# Patient Record
Sex: FEMALE | Race: WHITE | NOT HISPANIC OR LATINO | URBAN - METROPOLITAN AREA
[De-identification: names, ages, dates, MRNs, and addresses within clinical notes are randomized per-mention and may not be internally consistent; named-entity substitution may affect disease eponyms.]

---
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
## 2023-04-13 ENCOUNTER — VIRTUAL VISIT (OUTPATIENT)
Dept: FAMILY MEDICINE | Facility: CLINIC | Age: 18
End: 2023-04-13
Payer: COMMERCIAL

## 2023-04-13 DIAGNOSIS — Z09 HOSPITAL DISCHARGE FOLLOW-UP: Primary | ICD-10-CM

## 2023-04-13 DIAGNOSIS — R10.13 ABDOMINAL PAIN, EPIGASTRIC: ICD-10-CM

## 2023-04-13 DIAGNOSIS — R10.33 UMBILICAL PAIN: ICD-10-CM

## 2023-04-13 PROCEDURE — 99214 OFFICE O/P EST MOD 30 MIN: CPT | Mod: VID | Performed by: NURSE PRACTITIONER

## 2023-04-13 NOTE — PROGRESS NOTES
"Mary is a 18 year old who is being evaluated via a billable video visit.      How would you like to obtain your AVS? MyChart  If the video visit is dropped, the invitation should be resent by: Text to cell phone: [phone]  Will anyone else be joining your video visit? No        Assessment & Plan     Hospital discharge follow-up      Abdominal pain, epigastric      Umbilical pain    30 minutes spent by me on the date of the encounter doing chart review, history and exam, documentation and further activities per the note     MED REC REQUIRED  Post Medication Reconciliation Status: discharge medications reconciled, continue medications without change  BMI:   Estimated body mass index is 49.6 kg/m  as calculated from the following:    Height as of 5/17/22: 1.583 m (5' 2.32\").    Weight as of 5/17/22: 124.3 kg (274 lb).   Weight management plan: Discussed healthy diet and exercise guidelines Discussed regular exercise, diet changes, weight loss will help to prevent chronic disease such as diabetes, high blood pressure, high cholesterol and will improve mood    See Patient Instructions: Review after visit summary tips.  Cut out foods that could be causing symptoms.  If symptoms worsen go in for further evaluation.  If symptoms persist let me know if you want abdominal ultrasound to look for hernia versus EGD scope to look for H. pylori or ulcers.  Patient in agreement.    SAL WALLACE  [facility] RAINER Hernandez is a 18 year old, presenting for the following health issues:  Hospital F/U    Chart reviewed and discussed with patient she went to the ER for abdominal pain she reports the pain started the Sunday prior to Easter.  She did have abdominal CT that was normal.  She reports the pain is around her bellybutton and comes and goes.  She has not found anything that makes it better or worse.  She does have nausea when she is having the pain.  She does not have a family history of " Crohn's, colitis, diverticulitis or colon cancer; she denies a change of stools or urinary symptoms.  She denies chance of pregnancy.  She denies fevers.  The pain is more often when laying down for sleep.  She describes the pain as when her stomach feels empty or hollow.  She denies heartburn or acid reflux.  She was given famotidine at the ER and feels her symptoms are improving but not resolved.  Discussed she can continue the acid reducer.  We can give her information about foods to avoid that could be triggering her symptoms she can try food elimination diet.  If symptoms persist we can either do an ultrasound to look and see if she has a hernia or issues going on with her appendix such as a smoldering appendix or we can do an EGD scope that would test for H. pylori and or look for ulcers.  Patient will review after visit summary tips and try food limit nation diet as well as continued acid reducer.  If symptoms continue she will contact provider for further treatment or imaging.  Discussed if symptoms are severe return to the ER.  Patient in agreement.      4/13/2023     8:13 AM   Additional Questions   Roomed by Radha CARDENAS     ED/UC Followup:    Facility: [facility]   Date of visit: 4/8/23  Reason for visit: abdominal pain  Current Status:      Review of Systems   Constitutional, HEENT, cardiovascular, pulmonary, GI, , musculoskeletal, neuro, skin, endocrine and psych systems are negative, except as otherwise noted.      Objective           Vitals:  No vitals were obtained today due to virtual visit.    Physical Exam   GENERAL: Healthy, alert and no distress  EYES: Eyes grossly normal to inspection.  No discharge or erythema, or obvious scleral/conjunctival abnormalities.  RESP: No audible wheeze, cough, or visible cyanosis.  No visible retractions or increased work of breathing.    SKIN: Visible skin clear. No significant rash, abnormal pigmentation or lesions.  NEURO: Cranial nerves grossly  intact.  Mentation and speech appropriate for age.  PSYCH: Mentation appears normal, affect normal/bright, judgement and insight intact, normal speech and appearance well-groomed.    See orders        Video-Visit Details    Type of service:  Video Visit     Originating Location (pt. Location): Home  Distant Location (provider location):  On-site  Platform used for Video Visit: John

## 2023-04-21 ENCOUNTER — MYC MEDICAL ADVICE (OUTPATIENT)
Dept: FAMILY MEDICINE | Facility: CLINIC | Age: 18
End: 2023-04-21
Payer: COMMERCIAL

## 2023-05-17 ASSESSMENT — ENCOUNTER SYMPTOMS
HEADACHES: 0
PALPITATIONS: 0
SHORTNESS OF BREATH: 0
EYE PAIN: 0
CHILLS: 0
MYALGIAS: 0
PARESTHESIAS: 0
NAUSEA: 0
WEAKNESS: 0
BREAST MASS: 0
COUGH: 0
DIZZINESS: 0
FEVER: 0
HEARTBURN: 0
JOINT SWELLING: 0
HEMATURIA: 0
NERVOUS/ANXIOUS: 0
DYSURIA: 0
DIARRHEA: 0
CONSTIPATION: 0
ABDOMINAL PAIN: 1
ARTHRALGIAS: 0
FREQUENCY: 0
HEMATOCHEZIA: 0
SORE THROAT: 0

## 2023-05-18 ENCOUNTER — OFFICE VISIT (OUTPATIENT)
Dept: FAMILY MEDICINE | Facility: CLINIC | Age: 18
End: 2023-05-18
Payer: COMMERCIAL

## 2023-05-18 VITALS
HEIGHT: 62 IN | DIASTOLIC BLOOD PRESSURE: 68 MMHG | BODY MASS INDEX: 51.93 KG/M2 | TEMPERATURE: 98.3 F | WEIGHT: 282.2 LBS | RESPIRATION RATE: 20 BRPM | SYSTOLIC BLOOD PRESSURE: 120 MMHG | OXYGEN SATURATION: 98 % | HEART RATE: 127 BPM

## 2023-05-18 DIAGNOSIS — R10.33 UMBILICAL PAIN: ICD-10-CM

## 2023-05-18 DIAGNOSIS — R10.9 INTERMITTENT ABDOMINAL PAIN: ICD-10-CM

## 2023-05-18 DIAGNOSIS — E66.01 CLASS 3 SEVERE OBESITY DUE TO EXCESS CALORIES WITHOUT SERIOUS COMORBIDITY WITH BODY MASS INDEX (BMI) OF 50.0 TO 59.9 IN ADULT (H): ICD-10-CM

## 2023-05-18 DIAGNOSIS — Z00.00 ROUTINE GENERAL MEDICAL EXAMINATION AT A HEALTH CARE FACILITY: Primary | ICD-10-CM

## 2023-05-18 DIAGNOSIS — E66.813 CLASS 3 SEVERE OBESITY DUE TO EXCESS CALORIES WITHOUT SERIOUS COMORBIDITY WITH BODY MASS INDEX (BMI) OF 50.0 TO 59.9 IN ADULT (H): ICD-10-CM

## 2023-05-18 PROCEDURE — 99395 PREV VISIT EST AGE 18-39: CPT | Performed by: NURSE PRACTITIONER

## 2023-05-18 PROCEDURE — 99214 OFFICE O/P EST MOD 30 MIN: CPT | Mod: 25 | Performed by: NURSE PRACTITIONER

## 2023-05-18 RX ORDER — PHENTERMINE HYDROCHLORIDE 37.5 MG/1
37.5 CAPSULE ORAL EVERY MORNING
Qty: 30 CAPSULE | Refills: 2 | Status: SHIPPED | OUTPATIENT
Start: 2023-05-18 | End: 2024-06-12

## 2023-05-18 ASSESSMENT — ENCOUNTER SYMPTOMS
SHORTNESS OF BREATH: 0
PALPITATIONS: 0
SORE THROAT: 0
BREAST MASS: 0
EYE PAIN: 0
NERVOUS/ANXIOUS: 0
CONSTIPATION: 0
ARTHRALGIAS: 0
DIZZINESS: 0
ABDOMINAL PAIN: 1
DYSURIA: 0
HEMATOCHEZIA: 0
PARESTHESIAS: 0
DIARRHEA: 0
HEARTBURN: 0
CHILLS: 0
HEADACHES: 0
FREQUENCY: 0
WEAKNESS: 0
COUGH: 0
NAUSEA: 0
FEVER: 0
MYALGIAS: 0
HEMATURIA: 0
JOINT SWELLING: 0

## 2023-05-18 ASSESSMENT — PAIN SCALES - GENERAL: PAINLEVEL: NO PAIN (0)

## 2023-05-18 NOTE — PROGRESS NOTES
SUBJECTIVE:   CC: Mary is an 18 year old who presents for preventive health visit.       2023     4:36 PM   Additional Questions   Roomed by Radha   Accompanied by n/a   Patient has been advised of split billing requirements and indicates understanding: Yes  Healthy Habits:     Getting at least 3 servings of Calcium per day:  Yes    Bi-annual eye exam:  NO    Dental care twice a year:  Yes    Sleep apnea or symptoms of sleep apnea:  None    Diet:  Regular (no restrictions)    Frequency of exercise:  2-3 days/week    Duration of exercise:  45-60 minutes    Taking medications regularly:  Not Applicable    Medication side effects:  Not applicable    PHQ-2 Total Score: 0    Additional concerns today:  Yes      Patient would still like to discuss the followin.) abdominal pain -she reports after her last visit her abdominal pain had resolved.  Last Monday night her pain was so significant that her lip was shaking she rates it 9 out of 10.  The pain was in the center of her abdomen near her bellybutton.  Her stomach felt hollow, pain improved with sitting up worse with laying down after approximately 1 hour the pain resolved.  Previously when she was in the ER the pain lasted for hours, they had given her something she is not sure if it helped or not.  She is unsure if she had ate something that caused her symptoms.  She denies diarrhea, constipation, vomiting, heartburn or acid reflux, any medications, alcohol use, fever no rectal bleeding or change in stools.  She did have some nausea due to the pain.  No family history of bowel conditions.  She had a CT abdomen and labs at the ER and they were normal.  She still has her gallbladder and appendix and discussed smoldering appendix or gallstone after eating fatty foods however her pain is more in the middle of her stomach; discussed most likely hernia could order ultrasound if pain reoccurs.  She denies urinary symptoms.  She is not sexually active.  She  should check with insurance prior to scheduling.  If ultrasound is normal the next step would be colonoscopy or EGD or both.  Discussed if severe prolonged pain go to the ER.  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  She enjoys hanging out with friends, camping with family.  We discussed weight, need to exercise daily, lose weight or she could become diabetic, have high blood pressure or high cholesterol.  She has not tried weight loss medication before we discussed phentermine.  This is a controlled medication that helps patients lose 20 to 30 pounds with diet and exercise.  It would be a jumpstart for her weight loss.  Tips given on after visit summary.  If she wanted to continue medication after 3 months she would need some type of follow-up visit.  Patient is in agreement to try medication.  She is declining labs or vaccines today.                Today's PHQ-2 Score:       5/17/2023     7:40 PM   PHQ-2 ( 1999 Pfizer)   Q1: Little interest or pleasure in doing things 0   Q2: Feeling down, depressed or hopeless 0   PHQ-2 Score 0   Q1: Little interest or pleasure in doing things Not at all   Q2: Feeling down, depressed or hopeless Not at all   PHQ-2 Score 0           Social History     Tobacco Use     Smoking status: Never     Smokeless tobacco: Never   Vaping Use     Vaping status: Never Used   Substance Use Topics     Alcohol use: Never             5/17/2023     7:40 PM   Alcohol Use   Prescreen: >3 drinks/day or >7 drinks/week? Not Applicable     Reviewed orders with patient.  Reviewed health maintenance and updated orders accordingly - Yes  Lab work is in process  Labs reviewed in EPIC  BP Readings from Last 3 Encounters:   05/18/23 120/68   05/17/22 131/80   05/04/21 115/75    Wt Readings from Last 3 Encounters:   05/18/23 128 kg (282 lb 3.2 oz)   05/17/22  124.3 kg (274 lb) (>99 %, Z= 2.65)*   05/04/21 115.4 kg (254 lb 6.4 oz) (>99 %, Z= 2.48)*     * Growth percentiles are based on CDC (Girls, 2-20 Years) data.                  Patient Active Problem List   Diagnosis     Nephrolithiasis     Class 3 severe obesity due to excess calories without serious comorbidity with body mass index (BMI) of 45.0 to 49.9 in adult (H)     Abdominal pain, epigastric     Umbilical pain     Past Surgical History:   Procedure Laterality Date     PE TUBES         Social History     Tobacco Use     Smoking status: Never     Smokeless tobacco: Never   Vaping Use     Vaping status: Never Used   Substance Use Topics     Alcohol use: Never     Family History   Problem Relation Age of Onset     Hypertension Father      Diabetes Type 1 Maternal Cousin          Current Outpatient Medications   Medication Sig Dispense Refill     phentermine (ADIPEX-P) 37.5 MG capsule Take 1 capsule (37.5 mg) by mouth every morning Monitor blood pressure want less than 140/80 at rest.  Needs some type of follow-up visit in 3 months can be a MyChart E-visit if wanting to continue medication. 30 capsule 2     famotidine (PEPCID) 20 MG tablet Take 1 tablet by mouth 2 times daily       No Known Allergies  Recent Labs   Lab Test 05/17/22  1447 10/28/20  1547   A1C 5.7*  --    *  --    HDL 46*  --    TRIG 84  --    CR  --  0.72   GFRESTIMATED  --  GFR not calculated, patient <18 years old.   GFRESTBLACK  --  GFR not calculated, patient <18 years old.   POTASSIUM  --  3.9   TSH 3.52  --         Breast Cancer Screening:        History of abnormal Pap smear: NO - under age 21, PAP not appropriate for age     Reviewed and updated as needed this visit by clinical staff   Tobacco  Allergies  Meds  Problems  Med Hx  Surg Hx  Fam Hx  Soc   Hx        Reviewed and updated as needed this visit by Provider   Tobacco  Allergies  Meds  Problems  Med Hx  Surg Hx  Fam Hx  Soc   Hx       Past Medical History:  "  Diagnosis Date     Otitis media     Recurrent until about 1 year of age     Streptococcal pharyngitis       Past Surgical History:   Procedure Laterality Date     PE TUBES       OB History   No obstetric history on file.       Review of Systems   Constitutional: Negative for chills and fever.   HENT: Negative for congestion, ear pain, hearing loss and sore throat.    Eyes: Negative for pain and visual disturbance.   Respiratory: Negative for cough and shortness of breath.    Cardiovascular: Negative for chest pain, palpitations and peripheral edema.   Gastrointestinal: Positive for abdominal pain. Negative for constipation, diarrhea, heartburn, hematochezia and nausea.   Breasts:  Negative for tenderness, breast mass and discharge.   Genitourinary: Negative for dysuria, frequency, genital sores, hematuria, pelvic pain, urgency, vaginal bleeding and vaginal discharge.   Musculoskeletal: Negative for arthralgias, joint swelling and myalgias.   Skin: Negative for rash.   Neurological: Negative for dizziness, weakness, headaches and paresthesias.   Psychiatric/Behavioral: Negative for mood changes. The patient is not nervous/anxious.           OBJECTIVE:   /68   Pulse (!) 127   Temp 98.3  F (36.8  C) (Temporal)   Resp 20   Ht 1.586 m (5' 2.44\")   Wt 128 kg (282 lb 3.2 oz)   LMP 04/22/2023 (Within Days)   SpO2 98%   BMI 50.89 kg/m    Physical Exam  GENERAL: healthy, alert and no distress  EYES: Eyes grossly normal to inspection, PERRL and conjunctivae and sclerae normal  HENT: ear canals and TM's normal, nose and mouth without ulcers or lesions  NECK: no adenopathy, no asymmetry, masses, or scars and thyroid normal to palpation  RESP: lungs clear to auscultation - no rales, rhonchi or wheezes  BREAST: Deferred per guidelines-asymptomatic per patient  CV: regular rate and rhythm, normal S1 S2, no S3 or S4, no murmur, click or rub, no peripheral edema and peripheral pulses strong  ABDOMEN: soft, nontender, " no hepatosplenomegaly, no masses and bowel sounds normal; no hernia felt   (female): Deferred per patient, no concerns discussed due for Pap at age 19  MS: no gross musculoskeletal defects noted, no edema, no CVA tenderness  SKIN: no suspicious lesions or rashes  NEURO: Normal strength and tone, cranial nerves intact, mentation intact and speech normal  PSYCH: mentation appears normal, affect normal/bright  LYMPH: no cervical, supraclavicular, axillary adenopathy    Diagnostic Test Results:  Labs reviewed in Epic      ASSESSMENT/PLAN:       ICD-10-CM    1. Routine general medical examination at a health care facility  Z00.00       2. Class 3 severe obesity due to excess calories without serious comorbidity with body mass index (BMI) of 50.0 to 59.9 in adult (H)  E66.01 phentermine (ADIPEX-P) 37.5 MG capsule    Z68.43       3. Umbilical pain  R10.33 US Hernia Evaluation      4. Intermittent abdominal pain  R10.9 US Hernia Evaluation          Patient has been advised of split billing requirements and indicates understanding: Yes      COUNSELING:  Reviewed preventive health counseling, as reflected in patient instructions        She reports that she has never smoked. She has never used smokeless tobacco.          SAIMA MENEZES, SAL  [facility] RAINER

## 2023-05-18 NOTE — PATIENT INSTRUCTIONS
b  Preventive Health Recommendations  Female Ages 18 to 20     Yearly exam:   See your health care provider every year in order to  Review health changes.   Discuss preventive care.    Review your medicines if your doctor has prescribed any.    You should be tested each year for STDs (sexually transmitted diseases).     After age 20, talk to your provider about how often you should have cholesterol testing.    If you are at risk for diabetes, you should have a diabetes test (fasting glucose).     Shots:   Get a flu shot each year.   Get a tetanus shot every 10 years.   Consider getting the shot (vaccine) that prevents cervical cancer (Gardasil).    Nutrition:   Eat at least 5 servings of fruits and vegetables each day.  Eat whole-grain bread, whole-wheat pasta and brown rice instead of white grains and rice.  Get adequate Calcium and Vitamin D.     Lifestyle  Exercise at least 150 minutes a week each week (30 minutes a day, 5 days a week). This will help you control your weight and prevent disease.  No smoking.   Wear sunscreen to prevent skin cancer.  See your dentist every six months for an exam and cleaning.

## 2023-05-19 RX ORDER — FAMOTIDINE 20 MG/1
1 TABLET, FILM COATED ORAL
COMMUNITY
Start: 2023-04-08 | End: 2023-10-02

## 2023-10-02 ENCOUNTER — VIRTUAL VISIT (OUTPATIENT)
Dept: FAMILY MEDICINE | Facility: CLINIC | Age: 18
End: 2023-10-02
Payer: COMMERCIAL

## 2023-10-02 DIAGNOSIS — R10.13 ABDOMINAL PAIN, EPIGASTRIC: Primary | ICD-10-CM

## 2023-10-02 PROCEDURE — 99214 OFFICE O/P EST MOD 30 MIN: CPT | Mod: VID | Performed by: STUDENT IN AN ORGANIZED HEALTH CARE EDUCATION/TRAINING PROGRAM

## 2023-10-02 RX ORDER — DICYCLOMINE HCL 20 MG
20 TABLET ORAL 4 TIMES DAILY PRN
Qty: 45 TABLET | Refills: 0 | Status: SHIPPED | OUTPATIENT
Start: 2023-10-02 | End: 2024-06-12

## 2023-10-02 NOTE — PROGRESS NOTES
"Mary is a 18 year old who is being evaluated via a billable video visit.      How would you like to obtain your AVS? MyChart  If the video visit is dropped, the invitation should be resent by: Text to cell phone: [phone]  Will anyone else be joining your video visit? No          Assessment & Plan     (R10.13) Abdominal pain, epigastric  (primary encounter diagnosis)  Comment: Chronic, uncontrolled. DDX include gastric ulcer, food intolerance, pain with gastric emptying, IBS. Will provide patient with gatro referral for further evaluation. Will do a trial of bentyl to see if pain will improve  Plan: REVIEW OF HEALTH MAINTENANCE PROTOCOL ORDERS,         Adult GI  Referral - Consult Only,         dicyclomine (BENTYL) 20 MG tablet        Pending eval     Dictation Disclaimer: Some of this Note has been completed with voice-recognition dictation software. Although errors are generally corrected real-time, there is the potential for a rare error to be present in the completed chart.                BMI:   Estimated body mass index is 50.89 kg/m  as calculated from the following:    Height as of 5/18/23: 1.586 m (5' 2.44\").    Weight as of 5/18/23: 128 kg (282 lb 3.2 oz).           KEESHA ALBERT MD  [facility]    Ravindra Hernandez is a 18 year old, presenting for the following health issues:  Abdominal Pain      10/2/2023     2:32 PM   Additional Questions   Roomed by Pearl       History of Present Illness       Reason for visit:  Abdominal pain    She eats 2-3 servings of fruits and vegetables daily.She consumes 1 sweetened beverage(s) daily.She exercises with enough effort to increase her heart rate 20 to 29 minutes per day.  She exercises with enough effort to increase her heart rate 3 or less days per week.   She is not taking prescribed medications regularly due to remembering to take.       HISTORY OF PRESENT ILLNESS  The patient presents via virtual visit for evaluation of " abdominal pain.    She has been having abdominal pain for a little over 2 weeks, and it has  been bad. It will keep her awake, but after a few hours, it will go away and she is able to sleep. Last night, she was up the entire night. It comes and goes. If it is milder, it feels like her stomach is empty and she feels hungry even though she is not. When she has severe pain, it feels like someone is putting a lot of weight and pushing it down on her stomach. Her right side will hurt too. She has had kidney stones in the past, but this pain is in the same area. She has tried eating less fried food, bread, and pasta, but the pain seems random. It only starts hurting when she is laying down. She has tried Tums, but it does not help much. She has not been seen by a gastroenterologist. She had this problem in 05/2023 and went to the ER because she thought it was a kidney infection. They did a CT scan, which was normal. It stopped after a few days, but now it is back. She has had a lot of nausea. She had some diarrhea last night that lasted through the night, but it has not come back. It does feel like burning sometimes, but it comes and goes.            Review of Systems   CONSTITUTIONAL: NEGATIVE for fever, chills, change in weight  ENT/MOUTH: NEGATIVE for ear, mouth and throat problems  RESP: NEGATIVE for significant cough or SOB  CV: NEGATIVE for chest pain, palpitations or peripheral edema  GI: POSITIVE for abdominal pain epigastric      Objective           Vitals:  No vitals were obtained today due to virtual visit.    Physical Exam   GENERAL: Healthy, alert and no distress  EYES: Eyes grossly normal to inspection.  No discharge or erythema, or obvious scleral/conjunctival abnormalities.  RESP: No audible wheeze, cough, or visible cyanosis.  No visible retractions or increased work of breathing.    SKIN: Visible skin clear. No significant rash, abnormal pigmentation or lesions.  NEURO: Cranial nerves grossly intact.   Mentation and speech appropriate for age.  PSYCH: Mentation appears normal, affect normal/bright, judgement and insight intact, normal speech and appearance well-groomed.                Video-Visit Details    Type of service:  Video Visit     Originating Location (pt. Location): Home    Distant Location (provider location):  Off-site  Platform used for Video Visit: CustomInk

## 2023-10-10 NOTE — TELEPHONE ENCOUNTER
Records Requested     October 10, 2023 1:41 PM  [id number]   Facility  [facility]  Fax: [phone]   Outcome Urgent request faxed to [facility] to push images to PACS.     Update 10/13/23 8:39 AM - Images resolved in PACS       REFERRAL INFORMATION:  Referring Provider:  Marilin Singh MD  Referring Clinic:  [facility]   Reason for Visit/Diagnosis: Abdominal pain, epigastric [R10.13]     FUTURE VISIT INFORMATION:  Appointment Date: 10/16/23  Appointment Time: 8:00 AM     NOTES STATUS DETAILS   OFFICE NOTE from Referring Provider Internal 10/2/23- Marilin Singh MD - [facility] [place] - Abdominal epigastric pain    OFFICE NOTE from Other Specialist Internal 5/18/23, 4/13/23 - Claudia Flynn NP - Abdominal pain; hospital follow up   HOSPITAL DISCHARGE SUMMARY/  ED VISITS Care Everywhere 4/8/23- [facility] ED - Miki Lopez DO - Abdominal pain   MEDICATION LIST Internal         PERTINENT LABS Care Everywhere 4/8/23 - CBC no diff; BMP   10/31/22- CBC w/ diff    IMAGING (CT, MRI, EGD, MRCP, Small Bowel Follow Through/SBT, MR/CT Enterography) In PACS [facility]:   CT Abdomen Pelvis - 4/8/23, 10/31/22, 10/27/21, 10/1/20

## 2023-10-16 ENCOUNTER — TELEPHONE (OUTPATIENT)
Dept: GASTROENTEROLOGY | Facility: CLINIC | Age: 18
End: 2023-10-16

## 2023-10-16 ENCOUNTER — PRE VISIT (OUTPATIENT)
Dept: GASTROENTEROLOGY | Facility: CLINIC | Age: 18
End: 2023-10-16

## 2023-10-16 NOTE — TELEPHONE ENCOUNTER
LVM, sent mychart to reschedule the following appointment:      Appt on 10/16/23 with Nesha Alston due to the provider not being available. Reschedule with the same provider, next available appt in person or virtual. Left CC#

## 2023-10-23 ENCOUNTER — VIRTUAL VISIT (OUTPATIENT)
Dept: GASTROENTEROLOGY | Facility: CLINIC | Age: 18
End: 2023-10-23
Attending: STUDENT IN AN ORGANIZED HEALTH CARE EDUCATION/TRAINING PROGRAM
Payer: COMMERCIAL

## 2023-10-23 DIAGNOSIS — R10.84 ABDOMINAL PAIN, GENERALIZED: Primary | ICD-10-CM

## 2023-10-23 DIAGNOSIS — R10.13 ABDOMINAL PAIN, EPIGASTRIC: ICD-10-CM

## 2023-10-23 PROCEDURE — 99205 OFFICE O/P NEW HI 60 MIN: CPT | Mod: VID | Performed by: PHYSICIAN ASSISTANT

## 2023-10-23 NOTE — PROGRESS NOTES
"  GI CLINIC VISIT    CC/REFERRING MD:  Marilin Singh  REASON FOR CONSULTATION: R10.13 (ICD-10-CM) - Abdominal pain, epigastric    Chart Review  3 episodes of kidney stones, spontaneous clearance. Seen with urology, hydrate and follow up PRN    HPI  Mary Beckett is a 18 year old year old female with PMHx of kidney stones (3 episodes) presenting to establish care with the [facility] GI group for episodic abdominal pain.    Starting in April 2023, developed episodic abdominal pain.     Went to [facility] minute clinic tested negative for UTI.  Pain continued which prompted her to head to the emergency room shortly - extensive work-up unremarkable including abdomen pelvis CT stone protocol.     She reports pain is to the mid to lower abdomen with occasional radiation to right flank.  She will only have this pain when she lays down to sleep at night.  She does not have this pain if she lays down in the daytime. When it occurs, she gets up and this pain typically resolves. she did not have this pain last night.  She typically is scrolling on her phone before going to sleep - denies that activity being particularly distressing or stressful. Pain can be present 2-3 nights then resolve.  Longest stretch time with pain was 2 to 3 weeks (with symptom free nights during this time). Denies epigastric or upper abdomen pain when I directly asked.     Shares when the pain is not severe feels like, \" my stomach is empty, like a hollow pain\"  Shares when pain is severe, it feels like, \" someone pushing on my stomach, a lot of pressure\"  At times, she reports pain feels like, \" burning around my bellybutton\"  +Pain is associated with intermittent nausea without emesis, at times sensation is so severe she spends 15 minutes by the toilet thinking she vomits but she does not. Nausea typically starts after the severe pain.   + Pain is also associated with sensation of needing to use the restroom, however not being able to have a bowel movement, " ?tensemsus.  She denies nocturnal stooling, hematochezia , melena , uveitis, iritis, arthralgia, skin rashes, family history of IBD/autoimmune disease.     Overall feels like her bowel movements during these pain episodes are normal-appearing in appearance and character (BSC type 4) but feels she may go more frequently; estimated at 2 times daily.    Normal stooling (outside of pain episodes) described as constipated. Will have daily BMs, 1-2 a day, at times coupled.  Feels her stools are solid . endorses occ incomplete evacuation. Does strain with harder stools.  Will typically spend 10 min having a BM. No hematochezia, black tarry stools. No mucus.   It is common for her to have 1 day of stooling in a week with BSC type V-VI.  She does not think this stooling pattern is related to pain episodes.     She denies any changes to her daily routine in April 2023 including dietary changes, new meds/supplements/  herbals/stressors.     Meds tried  Recently given bentyl which she feels helps overall.  Though she did have breakthrough pain 2 nights in a row - she is not taking this regularly   Tried advil w/o relief in pain  TUMs PRN - variable relief, at times helps but not resolves, other times w/o relief     Of note -she has had episodes of nephrolithiasis, all spontaneously cleared.  1 left-sided, 2 right-sided.  No family history of nephrolithiasis. Has seen urology and shares she was told she needed to hydrate more which she is working hard on.   She has given Adipex to help lose weight, however she is not currently taking the medication.  She did not correlate the pain onset with the use of the medicine.     Does not typically suffer from acid reflux which feels like a burning located to the upper throat with nausea without emesis to her.  Sx occur once a every 2-3 weeks or so.  It is not common.  Denies regurgitation, dysphagia, odynophagia, trouble initiating swallow.     +occasional bloating.  But nothing  bothersome or constant.     Weight - stable   Appetite - overall preserved, can wax and wane with GI sx (depressed w/ pain, for example)     ROS  Denies fevers, chills, weight loss, , emesis, dysphagia, odynophagia, troublesome heartburn, regurgitation, troublesome bloating/fullness, post prandial bloating, black tarrying stools, bloody stools, rectal pain, rectal fullness, rectal itching.     Family Hx  No known other known family history or GI related malignancy (esophageal, gastric, pancreatic, liver or colon) or family history of IBD/celiac disease.     Social Hx   Work at dental office, completing internship for dental assistant     No use of NSAIDs or Tylenol.   No use of OTC herbal supplements/weight loss products     No ETOH  Never tobacco products   No recreational drug use    PROBLEM LIST  Patient Active Problem List    Diagnosis Date Noted     Nephrolithiasis 10/27/2020     Priority: High     Abdominal pain, epigastric 04/13/2023     Priority: Medium     Umbilical pain 04/13/2023     Priority: Medium     Class 3 severe obesity due to excess calories without serious comorbidity with body mass index (BMI) of 45.0 to 49.9 in adult (H) 05/17/2022     Priority: Medium       PERTINENT PAST MEDICAL HISTORY:  Past Medical History:   Diagnosis Date     Otitis media     Recurrent until about 1 year of age     Streptococcal pharyngitis        PREVIOUS SURGERIES:  Past Surgical History:   Procedure Laterality Date     PE TUBES         ALLERGIES:   No Known Allergies    PERTINENT MEDICATIONS:    Current Outpatient Medications:      dicyclomine (BENTYL) 20 MG tablet, Take 1 tablet (20 mg) by mouth 4 times daily as needed (abdominal pain), Disp: 45 tablet, Rfl: 0     phentermine (ADIPEX-P) 37.5 MG capsule, Take 1 capsule (37.5 mg) by mouth every morning Monitor blood pressure want less than 140/80 at rest.  Needs some type of follow-up visit in 3 months can be a MyChart E-visit if wanting to continue medication., Disp:  30 capsule, Rfl: 2    SOCIAL HISTORY:  Social History     Socioeconomic History     Marital status: Single     Spouse name: Not on file     Number of children: Not on file     Years of education: Not on file     Highest education level: Not on file   Occupational History     Not on file   Tobacco Use     Smoking status: Never     Smokeless tobacco: Never   Vaping Use     Vaping Use: Never used   Substance and Sexual Activity     Alcohol use: Never     Drug use: Never     Sexual activity: Never   Other Topics Concern     Not on file   Social History Narrative    Mary is a senior in high school. She will attend [employer] in the fall and plans to become a dental hygienist. She has an older brother who is healthy. The family lives in [place], [place].     Social Determinants of Health     Financial Resource Strain: Low Risk  (10/2/2023)    Financial Resource Strain      Within the past 12 months, have you or your family members you live with been unable to get utilities (heat, electricity) when it was really needed?: No   Food Insecurity: Low Risk  (10/2/2023)    Food Insecurity      Within the past 12 months, did you worry that your food would run out before you got money to buy more?: No      Within the past 12 months, did the food you bought just not last and you didn t have money to get more?: No   Transportation Needs: Low Risk  (10/2/2023)    Transportation Needs      Within the past 12 months, has lack of transportation kept you from medical appointments, getting your medicines, non-medical meetings or appointments, work, or from getting things that you need?: No   Physical Activity: Not on file   Stress: Not on file   Social Connections: Not on file   Interpersonal Safety: Not on file   Housing Stability: Low Risk  (10/2/2023)    Housing Stability      Do you have housing? : Yes      Are you worried about losing your housing?: No       FAMILY HISTORY:  Family History   Problem Relation Age of Onset      Hypertension Father      Diabetes Type 1 Maternal Cousin      Past/family/social history reviewed and no changes    PHYSICAL EXAMINATION:  Vitals reviewed: There were no vitals taken for this visit.  Wt:   Wt Readings from Last 2 Encounters:   05/18/23 128 kg (282 lb 3.2 oz)   05/17/22 124.3 kg (274 lb) (>99%, Z= 2.65)*     * Growth percentiles are based on CDC (Girls, 2-20 Years) data.      Video physical exam  General: Patient appears well in no acute distress.   Skin: No visualized rash or lesions on visualized skin  Eyes: EOMI, no erythema, sclera icterus or discharge noted  Resp: Appears to be breathing comfortably without accessory muscle usage, speaking in full sentences, no cough  MSK: Appears to have normal range of motion based on visualized movements  Neurologic: No apparent tremors, facial movements symmetric  Psych: affect normal, alert and oriented      PERTINENT STUDIES:    Office Visit on 05/17/2022   Component Date Value Ref Range Status     Hemoglobin A1C 05/17/2022 5.7 (H)  0.0 - 5.6 % Final     TSH 05/17/2022 3.52  0.40 - 4.00 mU/L Final     Cholesterol 05/17/2022 196 (H)  <170 mg/dL Final     Triglycerides 05/17/2022 84  <90 mg/dL Final     Direct Measure HDL 05/17/2022 46 (L)  >=50 mg/dL Final     LDL Cholesterol Calculated 05/17/2022 133 (H)  <=110 mg/dL Final     Non HDL Cholesterol 05/17/2022 150 (H)  <120 mg/dL Final     Patient Fasting > 8hrs? 05/17/2022 No   Final     Hepatitis C Antibody 05/17/2022 Nonreactive  Nonreactive Final     HIV Antigen Antibody Combo 05/17/2022 Nonreactive  Nonreactive Final        Lab Results   Component Value Date    CHOL 196 (H) 05/17/2022    TRIG 84 05/17/2022    HDL 46 (L) 05/17/2022     10/28/2020    BUN 16 10/28/2020    CO2 29 10/28/2020    TSH 3.52 05/17/2022        Liver Function Studies -   Recent Labs   Lab Test 10/28/20  1547   ALBUMIN 3.5        PREVIOUS ENDOSCOPY    No results found for this or any previous visit.       Estimated body mass  "index is 50.89 kg/m  as calculated from the following:    Height as of 5/18/23: 1.586 m (5' 2.44\").    Weight as of 5/18/23: 128 kg (282 lb 3.2 oz).    Complete \"Weight Managment Plan\" in the progress note from the Adult Preventative or Medicare smartsets, use phrase .WEIGHTPLAN, or choose an option from Weight Management Resources smartset below.      ASSESSMENT/PLAN:  Mary Beckett is a 18 year old year old female with PMHx significant for nephrolithiasis (x3), BMI 50 (on Adipex) who presents to establish care with the  [facility] GI team for abdominal pain.  Symptomatically reports episodic abdominal pain to mid/lower abdomen that starts after she lays down to sleep at night. New onset since April 2023.  This pain will typically resolve if she sits upright.  It is not present in the daytime even if she lays down. She has tried multiple meds with variable relief in this pain. CT A/P x 3 stone protocol w/o identifying etiology of this pain. Most recently CT A/P 4/2023 after a bad episode of this pain but shares by time she got CT, the pain had abated.     #Abdominal pain, mid and lower   Pattern suggestive of functional pain vs under treated constipation. Can also consider renal colic, MSK pain, gas/bloat vs hyper visceral sensitivity, IBS. DDX includes SIBO, IBD   -Order KUB with comment on stool burden.   - OK to start fiber per AVS for now . Consider addition of scheduled miralax per KUB findings   -Order TSH and TTG IgA (she is on gluten containing diet). Recent CBC and CMP WNL  -check ESR/CRP and fecal calpro.  If highly abnormal, will broach topic of diagnostic colonoscopy for further evaluation  -Hold infectious stool studies as she is currently not having any loose stools/diarrhea  -she does not report epigastric pain however given occ heartburn, will check h.pylori stool antigen  -Toileting techniques per AVS    Future consideration  1.  Start of pain and stool journal to help guide care  2.  CT abdomen " pelvis with contrast during pain episode  3.  GI RD consult for dietary evaluation and consideration of low FODMAP diet with retrial  4.  Health PsyD referral for comprehensive psychosocial evaluation       Orders Placed This Encounter   Procedures     X-ray Abdomen 1 vw     Tissue transglutaminase mirna IgA and IgG     IgA     Helicobacter pylori Antigen Stool     TSH with free T4 reflex     Calprotectin Feces     Erythrocyte sedimentation rate auto     CRP inflammation     Colorectal cancer screening: Age 45, unless otherwise clinically indicated    RTC 2 months, or sooner as needed    Thank you for this consultation.  It was a pleasure to participate in the care of this patient; please contact me with any further questions.      Mitzy Mojica PA-C    Follow up: As planned above. Today, I personally spent 38 minutes in direct face to face time with the patient, of which greater than 50% of the time was spent in patient education and counseling as described above. Approximately 25  minutes were spent on indirect care associated with the patient's consultation including but not limited to review of: patient medical records to date, clinic visits, hospital records, lab results, imaging studies, procedural documentation, and coordinating care with other providers. The findings from this review are summarized in the above note. All of the above accounted for a cumulative time of 63 minutes and was performed on the date of service.

## 2023-10-23 NOTE — PATIENT INSTRUCTIONS
It was a pleasure taking care of you today.  I've included a brief summary of our discussion and care plan from today's visit below.  Please review this information with your primary care provider.  _______________________________________________________________________    My recommendations are summarized as follows:  Labs and stool studies for eval   Please get XR of abdomen as well   OK to start daily Citrucel, 1 tsp daily x 2 weeks and increase gradually by 1 tsp weekly as tolerated. A good goal is 1 tablespoon twice daily  Tabulate daily fiber intake for next week. Use this as your baseline dietary fiber. Increase it gradually to 30 grams a day. Use citrucel in addition to foods to get to this goal   Agree with hydration   See toileting techniques at bottom of page     Please call RN Care Coordinator Ramandeep at [phone] with any questions or concerns.    Return to GI Clinic in 2 months to review your progress.    _______________________________________________________________________    How do I schedule labs, imaging studies, or procedures that were ordered in clinic today?      Labs: To schedule lab appointment at the [facility], use my chart or call [phone]. If you have a [facility] lab closer to home where you are regularly seen you can give them a call.      Procedures: If a colonoscopy, upper endoscopy, breath test, esophageal manometry, or pH impedence was ordered today, our endoscopy team will call you to schedule this. If you have not heard from our endoscopy team within a week, please call [phone] option 2 to schedule.      Imaging Studies: If you were scheduled for a CT scan, X-ray, MRI, ultrasound, HIDA scan or other imaging study, please call [phone] to have this scheduled.      Referral: If a referral to another specialty was ordered, expect a phone call or follow instructions above. If you have not heard from anyone regarding your referral in a week, please call our  "clinic to check the status.      Who do I call with any questions after my visit?  Please be in touch if there are any further questions that arise following today's visit.  There are multiple ways to contact your gastroenterology care team.       During business hours, you may reach a Gastroenterology nurse at [phone]     To schedule or reschedule an appointment, please call [phone].      You can always send a secure message through Dinetouch.  Dinetouch messages are answered by your nurse or doctor typically within 24 hours.  Please allow extra time on weekends and holidays.       For urgent/emergent questions after business hours, you may reach the on-call GI Fellow by contacting the [facility]  at [phone].     How will I get the results of any tests ordered?    You will receive all of your results.  If you have signed up for [employer], any tests ordered at your visit will be available to you after your physician reviews them.  Typically this takes 1-2 weeks.  If there are urgent results that require a change in your care plan, your physician or nurse will call you to discuss the next steps.       What is Dinetouch?  Dinetouch is a secure way for you to access all of your healthcare records from the [facility].  It is a web based computer program, so you can sign on to it from any location.  It also allows you to send secure messages to your care team.  I recommend signing up for Dinetouch access if you have not already done so and are comfortable with using a computer.       How to I schedule a follow-up visit?  If you did not schedule a follow-up visit today, please call [phone] to schedule a follow-up office visit.      Sincerely,    Mitzy Mojica PA-C  Gastroenterology  ------  Toileting techniques  -don t ignore the urge to defecate (try not to hold it in). Normal to have a bowel movement after waking in morning, after eating, or whenever \"nature calls\"  -limit time " pushing to less than 10 min on the toilet. If you are not able to have a bowel movement, stop, and try again when you have the urge    Positioning  1. Elevate knees above hips (use a squatty potty)?  2. Lean forward, elbows rested on knees   3. Bulge out abdomen, and straighten your spine    Correct position  -knees higher than hips  -lean forward and put elbows on knees  ?-bulge our your abdomen  -straighten your spine    Position is similar to Jaciel chauhan  [employer]

## 2023-10-23 NOTE — LETTER
"    10/23/2023         RE: Mary Beckett  [address] [place] [address]        Dear Colleague,    Thank you for referring your patient, Mary Beckett, to the [facility] [place]. Please see a copy of my visit note below.    Joined the call at 10/23/2023, 9:00:20 am.  Left the call at 10/23/2023, 9:38:26 am.  You were on the call for 38 minutes 5 seconds .      GI CLINIC VISIT    CC/REFERRING MD:  Marilin Singh  REASON FOR CONSULTATION: R10.13 (ICD-10-CM) - Abdominal pain, epigastric    Chart Review  3 episodes of kidney stones, spontaneous clearance. Seen with urology, hydrate and follow up PRN    HPI  Mary Beckett is a 18 year old year old female with PMHx of kidney stones (3 episodes) presenting to establish care with [facility] for episodic abdominal pain.    Starting in April 2023, developed episodic abdominal pain.     Went to [facility] minute clinic tested negative for UTI.  Pain continued which prompted her to head to the emergency room shortly - extensive work-up unremarkable including abdomen pelvis CT stone protocol.     She reports pain is to the mid to lower abdomen with occasional radiation to right flank.  She will only have this pain when she lays down to sleep at night.  She does not have this pain if she lays down in the daytime. When it occurs, she gets up and this pain typically resolves. she did not have this pain last night.  She typically is scrolling on her phone before going to sleep - denies that activity being particularly distressing or stressful. Pain can be present 2-3 nights then resolve.  Longest stretch time with pain was 2 to 3 weeks (with symptom free nights during this time). Denies epigastric or upper abdomen pain when I directly asked.     Shares when the pain is not severe feels like, \" my stomach is empty, like a hollow pain\"  Shares when pain is severe, it feels like, \" someone pushing on my stomach, a lot of pressure\"  At " "times, she reports pain feels like, \" burning around my bellybutton\"  +Pain is associated with intermittent nausea without emesis, at times sensation is so severe she spends 15 minutes by the toilet thinking she vomits but she does not. Nausea typically starts after the severe pain.   + Pain is also associated with sensation of needing to use the restroom, however not being able to have a bowel movement, ?tensemsus.  She denies nocturnal stooling, hematochezia , melena , uveitis, iritis, arthralgia, skin rashes, family history of IBD/autoimmune disease.     Overall feels like her bowel movements during these pain episodes are normal-appearing in appearance and character (BSC type 4) but feels she may go more frequently; estimated at 2 times daily.    Normal stooling (outside of pain episodes) described as constipated. Will have daily BMs, 1-2 a day, at times coupled.  Feels her stools are solid . endorses occ incomplete evacuation. Does strain with harder stools.  Will typically spend 10 min having a BM. No hematochezia, black tarry stools. No mucus.   It is common for her to have 1 day of stooling in a week with BSC type V-VI.  She does not think this stooling pattern is related to pain episodes.     She denies any changes to her daily routine in April 2023 including dietary changes, new meds/supplements/  herbals/stressors.     Meds tried  Recently given bentyl which she feels helps overall.  Though she did have breakthrough pain 2 nights in a row - she is not taking this regularly   Tried advil w/o relief in pain  TUMs PRN - variable relief, at times helps but not resolves, other times w/o relief     Of note -she has had episodes of nephrolithiasis, all spontaneously cleared.  1 left-sided, 2 right-sided.  No family history of nephrolithiasis. Has seen urology and shares she was told she needed to hydrate more which she is working hard on.   She has given Adipex to help lose weight, however she is not currently " taking the medication.  She did not correlate the pain onset with the use of the medicine.     Does not typically suffer from acid reflux which feels like a burning located to the upper throat with nausea without emesis to her.  Sx occur once a every 2-3 weeks or so.  It is not common.  Denies regurgitation, dysphagia, odynophagia, trouble initiating swallow.     +occasional bloating.  But nothing bothersome or constant.     Weight - stable   Appetite - overall preserved, can wax and wane with GI sx (depressed w/ pain, for example)     ROS  Denies fevers, chills, weight loss, , emesis, dysphagia, odynophagia, troublesome heartburn, regurgitation, troublesome bloating/fullness, post prandial bloating, black tarrying stools, bloody stools, rectal pain, rectal fullness, rectal itching.     Family Hx  No known other known family history or GI related malignancy (esophageal, gastric, pancreatic, liver or colon) or family history of IBD/celiac disease.     Social Hx   Work at dental office, completing internship for dental assistant     No use of NSAIDs or Tylenol.   No use of OTC herbal supplements/weight loss products     No ETOH  Never tobacco products   No recreational drug use    PROBLEM LIST  Patient Active Problem List    Diagnosis Date Noted    Nephrolithiasis 10/27/2020     Priority: High    Abdominal pain, epigastric 04/13/2023     Priority: Medium    Umbilical pain 04/13/2023     Priority: Medium    Class 3 severe obesity due to excess calories without serious comorbidity with body mass index (BMI) of 45.0 to 49.9 in adult (H) 05/17/2022     Priority: Medium       PERTINENT PAST MEDICAL HISTORY:  Past Medical History:   Diagnosis Date    Otitis media     Recurrent until about 1 year of age    Streptococcal pharyngitis        PREVIOUS SURGERIES:  Past Surgical History:   Procedure Laterality Date    PE TUBES         ALLERGIES:   No Known Allergies    PERTINENT MEDICATIONS:    Current Outpatient Medications:      dicyclomine (BENTYL) 20 MG tablet, Take 1 tablet (20 mg) by mouth 4 times daily as needed (abdominal pain), Disp: 45 tablet, Rfl: 0    phentermine (ADIPEX-P) 37.5 MG capsule, Take 1 capsule (37.5 mg) by mouth every morning Monitor blood pressure want less than 140/80 at rest.  Needs some type of follow-up visit in 3 months can be a MyChart E-visit if wanting to continue medication., Disp: 30 capsule, Rfl: 2    SOCIAL HISTORY:  Social History     Socioeconomic History    Marital status: Single     Spouse name: Not on file    Number of children: Not on file    Years of education: Not on file    Highest education level: Not on file   Occupational History    Not on file   Tobacco Use    Smoking status: Never    Smokeless tobacco: Never   Vaping Use    Vaping Use: Never used   Substance and Sexual Activity    Alcohol use: Never    Drug use: Never    Sexual activity: Never   Other Topics Concern    Not on file   Social History Narrative    Mary is a senior in high school. She will attend [employer] in the fall and plans to become a dental hygienist. She has an older brother who is healthy. The family lives in [place], [place].     Social Determinants of Health     Financial Resource Strain: Low Risk  (10/2/2023)    Financial Resource Strain     Within the past 12 months, have you or your family members you live with been unable to get utilities (heat, electricity) when it was really needed?: No   Food Insecurity: Low Risk  (10/2/2023)    Food Insecurity     Within the past 12 months, did you worry that your food would run out before you got money to buy more?: No     Within the past 12 months, did the food you bought just not last and you didn t have money to get more?: No   Transportation Needs: Low Risk  (10/2/2023)    Transportation Needs     Within the past 12 months, has lack of transportation kept you from medical appointments, getting your medicines, non-medical meetings or appointments, work, or from  getting things that you need?: No   Physical Activity: Not on file   Stress: Not on file   Social Connections: Not on file   Interpersonal Safety: Not on file   Housing Stability: Low Risk  (10/2/2023)    Housing Stability     Do you have housing? : Yes     Are you worried about losing your housing?: No       FAMILY HISTORY:  Family History   Problem Relation Age of Onset    Hypertension Father     Diabetes Type 1 Maternal Cousin      Past/family/social history reviewed and no changes    PHYSICAL EXAMINATION:  Vitals reviewed: There were no vitals taken for this visit.  Wt:   Wt Readings from Last 2 Encounters:   05/18/23 128 kg (282 lb 3.2 oz)   05/17/22 124.3 kg (274 lb) (>99%, Z= 2.65)*     * Growth percentiles are based on CDC (Girls, 2-20 Years) data.      Video physical exam  General: Patient appears well in no acute distress.   Skin: No visualized rash or lesions on visualized skin  Eyes: EOMI, no erythema, sclera icterus or discharge noted  Resp: Appears to be breathing comfortably without accessory muscle usage, speaking in full sentences, no cough  MSK: Appears to have normal range of motion based on visualized movements  Neurologic: No apparent tremors, facial movements symmetric  Psych: affect normal, alert and oriented      PERTINENT STUDIES:    Office Visit on 05/17/2022   Component Date Value Ref Range Status    Hemoglobin A1C 05/17/2022 5.7 (H)  0.0 - 5.6 % Final    TSH 05/17/2022 3.52  0.40 - 4.00 mU/L Final    Cholesterol 05/17/2022 196 (H)  <170 mg/dL Final    Triglycerides 05/17/2022 84  <90 mg/dL Final    Direct Measure HDL 05/17/2022 46 (L)  >=50 mg/dL Final    LDL Cholesterol Calculated 05/17/2022 133 (H)  <=110 mg/dL Final    Non HDL Cholesterol 05/17/2022 150 (H)  <120 mg/dL Final    Patient Fasting > 8hrs? 05/17/2022 No   Final    Hepatitis C Antibody 05/17/2022 Nonreactive  Nonreactive Final    HIV Antigen Antibody Combo 05/17/2022 Nonreactive  Nonreactive Final        Lab Results  "  Component Value Date    CHOL 196 (H) 05/17/2022    TRIG 84 05/17/2022    HDL 46 (L) 05/17/2022     10/28/2020    BUN 16 10/28/2020    CO2 29 10/28/2020    TSH 3.52 05/17/2022        Liver Function Studies -   Recent Labs   Lab Test 10/28/20  1547   ALBUMIN 3.5        PREVIOUS ENDOSCOPY    No results found for this or any previous visit.       Estimated body mass index is 50.89 kg/m  as calculated from the following:    Height as of 5/18/23: 1.586 m (5' 2.44\").    Weight as of 5/18/23: 128 kg (282 lb 3.2 oz).    Complete \"Weight Managment Plan\" in the progress note from the Adult Preventative or Medicare smartsets, use phrase .WEIGHTPLAN, or choose an option from Weight Management Resources smartset below.      ASSESSMENT/PLAN:  Mary Beckett is a 18 year old year old female with PMHx significant for nephrolithiasis (x3), BMI 50 (on Adipex) who presents to [facility] care with the  [facility] for abdominal pain.  Symptomatically reports episodic abdominal pain to mid/lower abdomen that starts after she lays down to sleep at night. New onset since April 2023.  This pain will typically resolve if she sits upright.  It is not present in the daytime even if she lays down. She has tried multiple meds with variable relief in this pain. CT A/P x 3 stone protocol w/o identifying etiology of this pain. Most recently CT A/P 4/2023 after a bad episode of this pain but shares by time she got CT, the pain had abated.     #Abdominal pain, mid and lower   Pattern suggestive of functional pain vs under treated constipation. Can also consider renal colic, MSK pain, gas/bloat vs hyper visceral sensitivity, IBS. DDX includes SIBO, IBD   -Order KUB with comment on stool burden.   - OK to start fiber per AVS for now . Consider addition of scheduled miralax per KUB findings   -Order TSH and TTG IgA (she is on gluten containing diet). Recent CBC and CMP WNL  -check ESR/CRP and fecal calpro.  If highly abnormal, will broach " topic of diagnostic colonoscopy for further evaluation  -Hold infectious stool studies as she is currently not having any loose stools/diarrhea  -she does not report epigastric pain however given occ heartburn, will check h.pylori stool antigen  -Toileting techniques per AVS    Future consideration  1.  Start of pain and stool journal to help guide care  2.  CT abdomen pelvis with contrast during pain episode  3.  GI RD consult for dietary evaluation and consideration of low FODMAP diet with retrial  4.  Health PsyD referral for comprehensive psychosocial evaluation       Orders Placed This Encounter   Procedures    X-ray Abdomen 1 vw    Tissue transglutaminase mirna IgA and IgG    IgA    Helicobacter pylori Antigen Stool    TSH with free T4 reflex    Calprotectin Feces    Erythrocyte sedimentation rate auto    CRP inflammation     Colorectal cancer screening: Age 45, unless otherwise clinically indicated    RTC 2 months, or sooner as needed    Thank you for this consultation.  It was a pleasure to participate in the care of this patient; please contact me with any further questions.      Follow up: As planned above. Today, I personally spent 38 minutes in direct face to face time with the patient, of which greater than 50% of the time was spent in patient education and counseling as described above. Approximately 25  minutes were spent on indirect care associated with the patient's consultation including but not limited to review of: patient medical records to date, clinic visits, hospital records, lab results, imaging studies, procedural documentation, and coordinating care with other providers. The findings from this review are summarized in the above note. All of the above accounted for a cumulative time of 63 minutes and was performed on the date of service.         Again, thank you for allowing me to participate in the care of your patient.      Sincerely,    Mitzy Mojica PA-C

## 2023-10-23 NOTE — PROGRESS NOTES
Virtual Visit Details    Type of service:  Video Visit     Originating Location (pt. Location): Home in [place]     Distant Location (provider location):  Off-site  Platform used for Video Visit: John     Joined the call at 10/23/2023, 9:00:20 am.  Left the call at 10/23/2023, 9:38:26 am.  You were on the call for 38 minutes 5 seconds .

## 2023-10-23 NOTE — NURSING NOTE
Is the patient currently in the state of [place]? YES    Visit mode:VIDEO    If the visit is dropped, the patient can be reconnected by: VIDEO VISIT: Send to e-mail at: [email or web address]    Will anyone else be joining the visit? NO  (If patient encounters technical issues they should call [phone])    How would you like to obtain your AVS? MyChart    Are changes needed to the allergy or medication list? No    Reason for visit: Consult    Diego CHIRINOS

## 2023-10-26 ENCOUNTER — TELEPHONE (OUTPATIENT)
Dept: GASTROENTEROLOGY | Facility: CLINIC | Age: 18
End: 2023-10-26
Payer: COMMERCIAL

## 2023-10-26 NOTE — TELEPHONE ENCOUNTER
Unable to LVM, sent MYC regarding the following    GI recheck, 2 mo follow-up/ RTC  ( around 12/23/2023) with Mitzy Mojica PA-C

## 2023-11-02 NOTE — TELEPHONE ENCOUNTER
2nd attempt- was able to LVM and sent mychart x2    Schedule:  2 mo follow-up appointment with Mitzy Mojica or Gen MAIRA PEREZ (around 12/23/23 and after labs/imaging are completed)

## 2023-11-10 ENCOUNTER — LAB (OUTPATIENT)
Dept: LAB | Facility: CLINIC | Age: 18
End: 2023-11-10
Payer: COMMERCIAL

## 2023-11-10 DIAGNOSIS — R10.84 ABDOMINAL PAIN, GENERALIZED: ICD-10-CM

## 2023-11-10 LAB — ERYTHROCYTE [SEDIMENTATION RATE] IN BLOOD BY WESTERGREN METHOD: 19 MM/HR (ref 0–20)

## 2023-11-10 PROCEDURE — 85652 RBC SED RATE AUTOMATED: CPT

## 2023-11-10 PROCEDURE — 82784 ASSAY IGA/IGD/IGG/IGM EACH: CPT

## 2023-11-10 PROCEDURE — 84443 ASSAY THYROID STIM HORMONE: CPT

## 2023-11-10 PROCEDURE — 86140 C-REACTIVE PROTEIN: CPT

## 2023-11-10 PROCEDURE — 36415 COLL VENOUS BLD VENIPUNCTURE: CPT

## 2023-11-10 PROCEDURE — 86364 TISS TRNSGLTMNASE EA IG CLAS: CPT

## 2023-11-11 LAB
CRP SERPL-MCNC: 7.02 MG/L
TSH SERPL DL<=0.005 MIU/L-ACNC: 3.37 UIU/ML (ref 0.3–4.2)

## 2023-11-13 LAB
IGA SERPL-MCNC: 169 MG/DL (ref 84–499)
TTG IGA SER-ACNC: 0.3 U/ML
TTG IGG SER-ACNC: <0.6 U/ML

## 2023-11-16 PROCEDURE — 87338 HPYLORI STOOL AG IA: CPT

## 2023-11-16 PROCEDURE — 83993 ASSAY FOR CALPROTECTIN FECAL: CPT

## 2023-11-17 LAB
CALPROTECTIN STL-MCNT: 21.7 MG/KG (ref 0–49.9)
H PYLORI AG STL QL IA: NEGATIVE

## 2024-01-02 DIAGNOSIS — R10.84 ABDOMINAL PAIN, GENERALIZED: Primary | ICD-10-CM

## 2024-01-11 ENCOUNTER — ANCILLARY PROCEDURE (OUTPATIENT)
Dept: GENERAL RADIOLOGY | Facility: CLINIC | Age: 19
End: 2024-01-11
Attending: PHYSICIAN ASSISTANT
Payer: COMMERCIAL

## 2024-01-11 DIAGNOSIS — R10.84 ABDOMINAL PAIN, GENERALIZED: ICD-10-CM

## 2024-01-11 PROCEDURE — 74018 RADEX ABDOMEN 1 VIEW: CPT | Mod: TC | Performed by: RADIOLOGY

## 2024-05-20 ENCOUNTER — MYC MEDICAL ADVICE (OUTPATIENT)
Dept: FAMILY MEDICINE | Facility: CLINIC | Age: 19
End: 2024-05-20
Payer: COMMERCIAL

## 2024-05-21 NOTE — CONFIDENTIAL NOTE
Pt should be advised to contact [facility] minute clinic as they are the ones who treated her with augmentin. LISS Banks, FNP-BC

## 2024-06-03 ENCOUNTER — TELEPHONE (OUTPATIENT)
Dept: FAMILY MEDICINE | Facility: CLINIC | Age: 19
End: 2024-06-03
Payer: COMMERCIAL

## 2024-06-03 NOTE — TELEPHONE ENCOUNTER
Patient Quality Outreach    Patient is due for the following:   Cervical Cancer Screening - PAP Needed  Physical Preventive Adult Physical  Type of outreach:    Sent [employer] message.  Questions for provider review:    None           Janet Velazquez MA  Chart routed to Care Team.

## 2024-06-12 ENCOUNTER — OFFICE VISIT (OUTPATIENT)
Dept: FAMILY MEDICINE | Facility: CLINIC | Age: 19
End: 2024-06-12
Payer: COMMERCIAL

## 2024-06-12 VITALS
HEIGHT: 63 IN | DIASTOLIC BLOOD PRESSURE: 78 MMHG | OXYGEN SATURATION: 97 % | WEIGHT: 275.4 LBS | RESPIRATION RATE: 20 BRPM | HEART RATE: 102 BPM | SYSTOLIC BLOOD PRESSURE: 120 MMHG | TEMPERATURE: 97.8 F | BODY MASS INDEX: 48.8 KG/M2

## 2024-06-12 DIAGNOSIS — Z13.1 SCREENING FOR DIABETES MELLITUS: ICD-10-CM

## 2024-06-12 DIAGNOSIS — Z83.3 FAMILY HISTORY OF DIABETES MELLITUS: ICD-10-CM

## 2024-06-12 DIAGNOSIS — E66.01 CLASS 3 SEVERE OBESITY DUE TO EXCESS CALORIES WITH BODY MASS INDEX (BMI) OF 45.0 TO 49.9 IN ADULT, UNSPECIFIED WHETHER SERIOUS COMORBIDITY PRESENT (H): ICD-10-CM

## 2024-06-12 DIAGNOSIS — Z13.29 SCREENING FOR THYROID DISORDER: ICD-10-CM

## 2024-06-12 DIAGNOSIS — Z82.49 FAMILY HX OF HYPERTENSION: ICD-10-CM

## 2024-06-12 DIAGNOSIS — Z00.00 ROUTINE GENERAL MEDICAL EXAMINATION AT A HEALTH CARE FACILITY: Primary | ICD-10-CM

## 2024-06-12 DIAGNOSIS — Z13.220 SCREENING CHOLESTEROL LEVEL: ICD-10-CM

## 2024-06-12 DIAGNOSIS — E66.813 CLASS 3 SEVERE OBESITY DUE TO EXCESS CALORIES WITH BODY MASS INDEX (BMI) OF 45.0 TO 49.9 IN ADULT, UNSPECIFIED WHETHER SERIOUS COMORBIDITY PRESENT (H): ICD-10-CM

## 2024-06-12 LAB — HBA1C MFR BLD: 5.9 % (ref 0–5.6)

## 2024-06-12 PROCEDURE — 86376 MICROSOMAL ANTIBODY EACH: CPT | Performed by: NURSE PRACTITIONER

## 2024-06-12 PROCEDURE — 82947 ASSAY GLUCOSE BLOOD QUANT: CPT | Performed by: NURSE PRACTITIONER

## 2024-06-12 PROCEDURE — 99213 OFFICE O/P EST LOW 20 MIN: CPT | Mod: 25 | Performed by: NURSE PRACTITIONER

## 2024-06-12 PROCEDURE — 36415 COLL VENOUS BLD VENIPUNCTURE: CPT | Performed by: NURSE PRACTITIONER

## 2024-06-12 PROCEDURE — 99395 PREV VISIT EST AGE 18-39: CPT | Performed by: NURSE PRACTITIONER

## 2024-06-12 PROCEDURE — 84443 ASSAY THYROID STIM HORMONE: CPT | Performed by: NURSE PRACTITIONER

## 2024-06-12 PROCEDURE — 80061 LIPID PANEL: CPT | Performed by: NURSE PRACTITIONER

## 2024-06-12 PROCEDURE — 83036 HEMOGLOBIN GLYCOSYLATED A1C: CPT | Performed by: NURSE PRACTITIONER

## 2024-06-12 RX ORDER — ZINC GLUCONATE 50 MG
50 TABLET ORAL DAILY
COMMUNITY

## 2024-06-12 SDOH — HEALTH STABILITY: PHYSICAL HEALTH: ON AVERAGE, HOW MANY MINUTES DO YOU ENGAGE IN EXERCISE AT THIS LEVEL?: 90 MIN

## 2024-06-12 SDOH — HEALTH STABILITY: PHYSICAL HEALTH: ON AVERAGE, HOW MANY DAYS PER WEEK DO YOU ENGAGE IN MODERATE TO STRENUOUS EXERCISE (LIKE A BRISK WALK)?: 5 DAYS

## 2024-06-12 ASSESSMENT — PAIN SCALES - GENERAL: PAINLEVEL: NO PAIN (0)

## 2024-06-12 ASSESSMENT — SOCIAL DETERMINANTS OF HEALTH (SDOH): HOW OFTEN DO YOU GET TOGETHER WITH FRIENDS OR RELATIVES?: ONCE A WEEK

## 2024-06-12 NOTE — PROGRESS NOTES
Preventive Care Visit  [facility] SAL MARIE, Family Medicine  Jun 12, 2024      Assessment & Plan     Routine general medical examination at a health care facility      Class 3 severe obesity due to excess calories with body mass index (BMI) of 45.0 to 49.9 in adult, unspecified whether serious comorbidity present (H)    - tirzepatide (MOUNJARO) 2.5 MG/0.5ML pen; Inject 2.5 mg Subcutaneous every 7 days for 30 days  - tirzepatide (MOUNJARO) 5 MG/0.5ML pen; Inject 5 mg Subcutaneous every 7 days for 30 days  - tirzepatide (MOUNJARO) 7.5 MG/0.5ML pen; Inject 7.5 mg Subcutaneous every 7 days for 30 days  - tirzepatide (MOUNJARO) 10 MG/0.5ML pen; Inject 10 mg Subcutaneous every 7 days for 30 days  - tirzepatide (MOUNJARO) 12.5 MG/0.5ML pen; Inject 12.5 mg Subcutaneous every 7 days for 30 days  - tirzepatide (MOUNJARO) 15 MG/0.5ML pen; Inject 15 mg Subcutaneous every 7 days for 90 days    Family history of diabetes mellitus    - tirzepatide (MOUNJARO) 2.5 MG/0.5ML pen; Inject 2.5 mg Subcutaneous every 7 days for 30 days  - tirzepatide (MOUNJARO) 5 MG/0.5ML pen; Inject 5 mg Subcutaneous every 7 days for 30 days  - tirzepatide (MOUNJARO) 7.5 MG/0.5ML pen; Inject 7.5 mg Subcutaneous every 7 days for 30 days  - tirzepatide (MOUNJARO) 10 MG/0.5ML pen; Inject 10 mg Subcutaneous every 7 days for 30 days  - tirzepatide (MOUNJARO) 12.5 MG/0.5ML pen; Inject 12.5 mg Subcutaneous every 7 days for 30 days  - tirzepatide (MOUNJARO) 15 MG/0.5ML pen; Inject 15 mg Subcutaneous every 7 days for 90 days    Family hx of hypertension    - tirzepatide (MOUNJARO) 2.5 MG/0.5ML pen; Inject 2.5 mg Subcutaneous every 7 days for 30 days  - tirzepatide (MOUNJARO) 5 MG/0.5ML pen; Inject 5 mg Subcutaneous every 7 days for 30 days  - tirzepatide (MOUNJARO) 7.5 MG/0.5ML pen; Inject 7.5 mg Subcutaneous every 7 days for 30 days  - tirzepatide (MOUNJARO) 10 MG/0.5ML pen; Inject 10 mg Subcutaneous every 7 days for 30  "days  - tirzepatide (MOUNJARO) 12.5 MG/0.5ML pen; Inject 12.5 mg Subcutaneous every 7 days for 30 days  - tirzepatide (MOUNJARO) 15 MG/0.5ML pen; Inject 15 mg Subcutaneous every 7 days for 90 days    Screening cholesterol level    - Lipid panel reflex to direct LDL Fasting; Future  - Lipid panel reflex to direct LDL Fasting    Screening for diabetes mellitus    - Glucose; Future  - Hemoglobin A1c; Future  - Glucose  - Hemoglobin A1c    Screening for thyroid disorder    - TSH with free T4 reflex; Future  - Thyroid peroxidase antibody; Future  - TSH with free T4 reflex  - Thyroid peroxidase antibody    Patient has been advised of split billing requirements and indicates understanding: Yes        BMI  Estimated body mass index is 49.41 kg/m  as calculated from the following:    Height as of this encounter: 1.59 m (5' 2.6\").    Weight as of this encounter: 124.9 kg (275 lb 6.4 oz).   Weight management plan: Discussed healthy diet and exercise guidelines discussed risks of obesity and family history.  Patient reports she has tried exercise, medication for weight loss and has been unsuccessful she becomes tearful.  Discussed not wanting to hurt her but concerned of risks of obesity causing chronic health problems or morbidity.  Discussed options for weight loss she is interested in trying medication.  No family history of thyroid cancer.  Discussed risks and preventing constipation.  Tips given on after visit summary let us know if you have any questions or concerns.    Counseling  Appropriate preventive services were discussed with this patient, including applicable screening as appropriate for fall prevention, nutrition, physical activity, Tobacco-use cessation, weight loss and cognition.  Checklist reviewing preventive services available has been given to the patient.  Reviewed patient's diet, addressing concerns and/or questions.   She is at risk for psychosocial distress and has been provided with information to " reduce risk.       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  Mental health doing well.  In agreement to screening labs.  Discussed Pap has had HPV vaccines, never been sexually active would like to wait on Pap.  Advised can have completed anytime if desired.  She is in agreement.  Discussed weight see above.    HPI            6/12/2024   General Health   How would you rate your overall physical health? Good   Feel stress (tense, anxious, or unable to sleep) Only a little   (!) STRESS CONCERN      6/12/2024   Nutrition   Three or more servings of calcium each day? Yes   Diet: Regular (no restrictions)   How many servings of fruit and vegetables per day? (!) 2-3   How many sweetened beverages each day? 0-1         6/12/2024   Exercise   Days per week of moderate/strenous exercise 5 days   Average minutes spent exercising at this level 90 min         6/12/2024   Social Factors   Frequency of gathering with friends or relatives Once a week   Worry food won't last until get money to buy more No   Food not last or not have enough money for food? No   Do you have housing?  Yes   Are you worried about losing your housing? No   Lack of transportation? No   Unable to get utilities (heat,electricity)? No         6/12/2024   Dental   Dentist two times every year? Yes            Today's PHQ-2 Score:       6/12/2024    10:45 AM   PHQ-2 ( 1999 [employer])   Q1: Little interest or pleasure in doing things 0   Q2: Feeling down, depressed or hopeless 0   PHQ-2 Score 0   Q1: Little interest or pleasure in doing things Not at all   Q2: Feeling down, depressed  or hopeless Not at all   PHQ-2 Score 0           6/12/2024   Substance Use   Alcohol more than 3/day or more than 7/wk No   Do you use any other substances recreationally? No     Social History     Tobacco Use    Smoking status: Never    Smokeless tobacco: Never   Vaping Use    Vaping status: Never Used   Substance Use Topics    Alcohol use: Never    Drug use: Never           6/12/2024   STI Screening   New sexual partner(s) since last STI/HIV test? No     History of abnormal Pap smear: No - age 21-29 PAP every 3 years recommended             6/12/2024   Contraception/Family Planning   Questions about contraception or family planning No        Reviewed and updated as needed this visit by Provider                    Past Medical History:   Diagnosis Date    Otitis media     Recurrent until about 1 year of age    Streptococcal pharyngitis      Past Surgical History:   Procedure Laterality Date    PE TUBES       OB History   No obstetric history on file.     Lab work is in process  Labs reviewed in EPIC  BP Readings from Last 3 Encounters:   06/12/24 120/78   05/18/23 120/68   05/17/22 131/80    Wt Readings from Last 3 Encounters:   06/12/24 124.9 kg (275 lb 6.4 oz)   05/18/23 128 kg (282 lb 3.2 oz)   05/17/22 124.3 kg (274 lb) (>99%, Z= 2.65)*     * Growth percentiles are based on CDC (Girls, 2-20 Years) data.                  Patient Active Problem List   Diagnosis    Nephrolithiasis    Class 3 severe obesity due to excess calories without serious comorbidity with body mass index (BMI) of 45.0 to 49.9 in adult (H)    Abdominal pain, epigastric    Umbilical pain     Past Surgical History:   Procedure Laterality Date    PE TUBES         Social History     Tobacco Use    Smoking status: Never    Smokeless tobacco: Never   Substance Use Topics    Alcohol use: Never     Family History   Problem Relation Age of Onset    Hypertension Father     Diabetes Type 1 Maternal Cousin          Current Outpatient Medications    Medication Sig Dispense Refill    [START ON 9/13/2024] tirzepatide (MOUNJARO) 10 MG/0.5ML pen Inject 10 mg Subcutaneous every 7 days for 30 days 2 mL 0    [START ON 10/14/2024] tirzepatide (MOUNJARO) 12.5 MG/0.5ML pen Inject 12.5 mg Subcutaneous every 7 days for 30 days 2 mL 0    [START ON 11/13/2024] tirzepatide (MOUNJARO) 15 MG/0.5ML pen Inject 15 mg Subcutaneous every 7 days for 90 days 6 mL 0    tirzepatide (MOUNJARO) 2.5 MG/0.5ML pen Inject 2.5 mg Subcutaneous every 7 days for 30 days 2 mL 0    [START ON 7/13/2024] tirzepatide (MOUNJARO) 5 MG/0.5ML pen Inject 5 mg Subcutaneous every 7 days for 30 days 2 mL 0    [START ON 8/13/2024] tirzepatide (MOUNJARO) 7.5 MG/0.5ML pen Inject 7.5 mg Subcutaneous every 7 days for 30 days 2 mL 0    zinc gluconate 50 MG tablet Take 50 mg by mouth daily       No Known Allergies  Recent Labs   Lab Test 06/12/24  1123 11/10/23  1335 05/17/22  1447 10/28/20  1547   A1C 5.9*  --  5.7*  --    LDL  --   --  133*  --    HDL  --   --  46*  --    TRIG  --   --  84  --    CR  --   --   --  0.72   GFRESTIMATED  --   --   --  GFR not calculated, patient <18 years old.   GFRESTBLACK  --   --   --  GFR not calculated, patient <18 years old.   POTASSIUM  --   --   --  3.9   TSH  --  3.37 3.52  --           Review of Systems  CONSTITUTIONAL: NEGATIVE for fever, chills, change in weight  INTEGUMENTARY/SKIN: NEGATIVE for worrisome rashes, moles or lesions  EYES: NEGATIVE for vision changes or irritation  ENT/MOUTH: NEGATIVE for ear, mouth and throat problems  RESP: NEGATIVE for significant cough or SOB  BREAST: NEGATIVE for masses, tenderness or discharge  CV: NEGATIVE for chest pain, palpitations or peripheral edema  GI: NEGATIVE for nausea, abdominal pain, heartburn, or change in bowel habits  : NEGATIVE for frequency, dysuria, or hematuria  MUSCULOSKELETAL: NEGATIVE for significant arthralgias or myalgia  NEURO: NEGATIVE for weakness, dizziness or paresthesias  ENDOCRINE: NEGATIVE for  "temperature intolerance, skin/hair changes  HEME: NEGATIVE for bleeding problems  PSYCHIATRIC: NEGATIVE for changes in mood or affect     Objective    Exam  /78   Pulse 102   Temp 97.8  F (36.6  C) (Temporal)   Resp 20   Ht 1.59 m (5' 2.6\")   Wt 124.9 kg (275 lb 6.4 oz)   LMP 05/23/2024 (Exact Date)   SpO2 97%   BMI 49.41 kg/m     Estimated body mass index is 49.41 kg/m  as calculated from the following:    Height as of this encounter: 1.59 m (5' 2.6\").    Weight as of this encounter: 124.9 kg (275 lb 6.4 oz).    Physical Exam  GENERAL: alert and no distress  EYES: Eyes grossly normal to inspection, PERRL and conjunctivae and sclerae normal  HENT: ear canals and TM's normal, nose and mouth without ulcers or lesions  NECK: no adenopathy, no asymmetry, masses, or scars  RESP: lungs clear to auscultation - no rales, rhonchi or wheezes  BREAST: Deferred per guidelines-asymptomatic per patient.  Discussed self breast exam, symptoms to watch out for and when to follow-up  CV: regular rate and rhythm, normal S1 S2, no S3 or S4, no murmur, click or rub, no peripheral edema  ABDOMEN: soft, nontender, no hepatosplenomegaly, no masses and bowel sounds normal   (female): Deferred per patient no concerns  MS: no gross musculoskeletal defects noted, no edema, no CVA tenderness  SKIN: no suspicious lesions or rashes  NEURO: Normal strength and tone, cranial nerves intact, mentation intact and speech normal  PSYCH: mentation appears normal, affect normal/bright; POSITIVE tearful at times discussing weight.   LYMPH: no cervical, supraclavicular adenopathy        Signed Electronically by: SAL WALLACE    "

## 2024-06-12 NOTE — PATIENT INSTRUCTIONS
"Patient Education   Preventive Care Advice   This is general advice we often give to help people stay healthy. Your care team may have specific advice just for you. Please talk to your care team about your own preventive care needs.  Lifestyle  Exercise at least 150 minutes each week (30 minutes a day, 5 days a week).  Do muscle strengthening activities 2 days a week. These help control your weight and prevent disease.  No smoking.  Wear sunscreen to prevent skin cancer.  Have your home tested for radon every 2 to 5 years. Radon is a colorless, odorless gas that can harm your lungs. To learn more, go to www.health.[facility].[place].us and search for \"Radon in Homes.\"  Keep guns unloaded and locked up in a safe place like a safe or gun vault, or, use a gun lock and hide the keys. Always lock away bullets separately. To learn more, visit [employer].gov and search for \"safe gun storage.\"  Nutrition  Eat 5 or more servings of fruits and vegetables each day.  Try wheat bread, brown rice and whole grain pasta (instead of white bread, rice, and pasta).  Get enough calcium and vitamin D. Check the label on foods and aim for 100% of the RDA (recommended daily allowance).  Regular exams  Have a dental exam and cleaning every 6 months.  See your health care team every year to talk about:  Any changes in your health.  Any medicines your care team has prescribed.  Preventive care, family planning, and ways to prevent chronic diseases.  Shots (vaccines)   HPV shots (up to age 26), if you've never had them before.  Hepatitis B shots (up to age 59), if you've never had them before.  COVID-19 shot: Get this shot when it's due.  Flu shot: Get a flu shot every year.  Tetanus shot: Get a tetanus shot every 10 years.  Pneumococcal, hepatitis A, and RSV shots: Ask your care team if you need these based on your risk.  Shingles shot (for age 50 and up).  General health tests  Diabetes screening:  Starting at age 35, Get screened for diabetes at least " every 3 years.  If you are younger than age 35, ask your care team if you should be screened for diabetes.  Cholesterol test: At age 39, start having a cholesterol test every 5 years, or more often if advised.  Bone density scan (DEXA): At age 50, ask your care team if you should have this scan for osteoporosis (brittle bones).  Hepatitis C: Get tested at least once in your life.  Abdominal aortic aneurysm screening: Talk to your doctor about having this screening if you:  Have ever smoked; and  Are biologically male; and  Are between the ages of 65 and 75.  STIs (sexually transmitted infections)  Before age 24: Ask your care team if you should be screened for STIs.  After age 24: Get screened for STIs if you're at risk. You are at risk for STIs (including HIV) if:  You are sexually active with more than one person.  You don't use condoms every time.  You or a partner was diagnosed with a sexually transmitted infection.  If you are at risk for HIV, ask about PrEP medicine to prevent HIV.  Get tested for HIV at least once in your life, whether you are at risk for HIV or not.  Cancer screening tests  Cervical cancer screening: If you have a cervix, begin getting regular cervical cancer screening tests at age 21. Most people who have regular screenings with normal results can stop after age 65. Talk about this with your provider.  Breast cancer scan (mammogram): If you've ever had breasts, begin having regular mammograms starting at age 40. This is a scan to check for breast cancer.  Colon cancer screening: It is important to start screening for colon cancer at age 45.  Have a colonoscopy test every 10 years (or more often if you're at risk) Or, ask your provider about stool tests like a FIT test every year or Cologuard test every 3 years.  To learn more about your testing options, visit: www.roomlinx/689003.pdf.  For help making a decision, visit: sofy/ga86494.  Prostate cancer screening test: If you have a  prostate and are age 55 to 69, ask your provider if you would benefit from a yearly prostate cancer screening test.  Lung cancer screening: If you are a current or former smoker age 50 to 80, ask your care team if ongoing lung cancer screenings are right for you.  For informational purposes only. Not to replace the advice of your health care provider. Copyright   2023 [facility]. All rights reserved. Clinically reviewed by the [facility] Transitions Program. [employer] [id number] - REV 04/24.

## 2024-06-13 LAB
CHOLEST SERPL-MCNC: 201 MG/DL
FASTING STATUS PATIENT QL REPORTED: NO
FASTING STATUS PATIENT QL REPORTED: NO
GLUCOSE SERPL-MCNC: 103 MG/DL (ref 70–99)
HDLC SERPL-MCNC: 49 MG/DL
LDLC SERPL CALC-MCNC: 135 MG/DL
NONHDLC SERPL-MCNC: 152 MG/DL
THYROPEROXIDASE AB SERPL-ACNC: 22 IU/ML
TRIGL SERPL-MCNC: 85 MG/DL
TSH SERPL DL<=0.005 MIU/L-ACNC: 2.82 UIU/ML (ref 0.3–4.2)

## 2024-06-14 NOTE — RESULT ENCOUNTER NOTE
Donald Hernandez,    Thank you for your recent office visit.    Here are your recent results.  Your glucose is considered normal however your A1c does show you are prediabetic.  If your insurance will cover that medication that will also improve.  Let me know if you have any issues.  Normal thyroid level.  Normal cholesterol for a nondiabetic.    Feel free to contact me via [employer] or call the clinic at [phone].    Sincerely,    LISS Banks, FNP-BC

## 2024-07-03 ENCOUNTER — TELEPHONE (OUTPATIENT)
Dept: FAMILY MEDICINE | Facility: CLINIC | Age: 19
End: 2024-07-03
Payer: COMMERCIAL

## 2024-07-03 NOTE — TELEPHONE ENCOUNTER
Patient Quality Outreach    Patient is due for the following:   Cervical Cancer Screening - PAP Needed      Topic Date Due    COVID-19 Vaccine (3 - 2023-24 season) 09/01/2023   Type of outreach:    Sent [employer] message.  Questions for provider review:  None       Janet Velazquez MA  Chart routed to Care Team.

## 2024-08-02 ENCOUNTER — TELEPHONE (OUTPATIENT)
Dept: FAMILY MEDICINE | Facility: CLINIC | Age: 19
End: 2024-08-02

## 2024-08-02 NOTE — TELEPHONE ENCOUNTER
Patient Quality Outreach    Patient is due for the following:   Cervical Cancer Screening - PAP Needed      Topic Date Due    COVID-19 Vaccine (3 - 2023-24 season) 09/01/2023   Type of outreach:    Sent [employer] message.  Questions for provider review:  None       Janet Velazquez MA  Chart routed to Care Team.

## 2024-08-20 ENCOUNTER — DOCUMENTATION ONLY (OUTPATIENT)
Dept: LAB | Facility: CLINIC | Age: 19
End: 2024-08-20
Payer: COMMERCIAL

## 2024-08-20 DIAGNOSIS — Z11.1 SCREENING EXAMINATION FOR PULMONARY TUBERCULOSIS: Primary | ICD-10-CM

## 2024-08-20 NOTE — PROGRESS NOTES
Patient has lab appointment 8.23.24 at 2:30 pm. Per patient [employer] need for school. Please place future orders for patient.    Thank you!  [facility] Lab staff

## 2024-08-23 ENCOUNTER — LAB (OUTPATIENT)
Dept: LAB | Facility: CLINIC | Age: 19
End: 2024-08-23
Payer: COMMERCIAL

## 2024-08-23 DIAGNOSIS — Z11.1 SCREENING EXAMINATION FOR PULMONARY TUBERCULOSIS: ICD-10-CM

## 2024-08-23 PROCEDURE — 36415 COLL VENOUS BLD VENIPUNCTURE: CPT

## 2024-08-23 PROCEDURE — 86481 TB AG RESPONSE T-CELL SUSP: CPT

## 2024-08-25 LAB
GAMMA INTERFERON BACKGROUND BLD IA-ACNC: 0 IU/ML
M TB IFN-G BLD-IMP: NEGATIVE
M TB IFN-G CD4+ BCKGRND COR BLD-ACNC: 10 IU/ML
MITOGEN IGNF BCKGRD COR BLD-ACNC: 0 IU/ML
MITOGEN IGNF BCKGRD COR BLD-ACNC: 0 IU/ML
QUANTIFERON MITOGEN: 10 IU/ML
QUANTIFERON NIL TUBE: 0 IU/ML
QUANTIFERON TB1 TUBE: 0 IU/ML
QUANTIFERON TB2 TUBE: 0

## 2024-09-03 ENCOUNTER — TELEPHONE (OUTPATIENT)
Dept: FAMILY MEDICINE | Facility: CLINIC | Age: 19
End: 2024-09-03
Payer: COMMERCIAL

## 2024-09-03 NOTE — TELEPHONE ENCOUNTER
Patient Quality Outreach    Patient is due for the following:   Cervical Cancer Screening - PAP Needed  Physical Preventive Adult Physical      Topic Date Due    Flu Vaccine (1) 09/01/2024    COVID-19 Vaccine (3 - 2023-24 season) 09/01/2024   Type of outreach:    Sent [employer] message.  Questions for provider review:  None       Janet Velazquez MA  Chart routed to Care Team.

## 2024-12-11 ENCOUNTER — VIRTUAL VISIT (OUTPATIENT)
Dept: FAMILY MEDICINE | Facility: CLINIC | Age: 19
End: 2024-12-11
Payer: COMMERCIAL

## 2024-12-11 DIAGNOSIS — G44.011 INTRACTABLE EPISODIC CLUSTER HEADACHE: Primary | ICD-10-CM

## 2024-12-11 PROCEDURE — 99213 OFFICE O/P EST LOW 20 MIN: CPT | Mod: 95 | Performed by: NURSE PRACTITIONER

## 2024-12-11 RX ORDER — RIZATRIPTAN BENZOATE 10 MG/1
10 TABLET ORAL
Qty: 30 TABLET | Refills: 1 | Status: SHIPPED | OUTPATIENT
Start: 2024-12-11

## 2024-12-11 NOTE — PATIENT INSTRUCTIONS
If headaches continue to recur over the next few days we will add a second treatment and schedule eye exam. If symptoms worse, go in for in person exam.

## 2024-12-11 NOTE — PROGRESS NOTES
"Mary is a 19 year old who is being evaluated via a billable video visit.    How would you like to obtain your AVS? MyChart  If the video visit is dropped, the invitation should be resent by: Text to cell phone: [phone]  Will anyone else be joining your video visit? No      Assessment & Plan     Intractable episodic cluster headache    - rizatriptan (MAXALT) 10 MG tablet; Take 1 tablet (10 mg) by mouth at onset of headache for migraine. May repeat in 2 hours. Max 3 tablets/24 hours.          BMI  Estimated body mass index is 49.41 kg/m  as calculated from the following:    Height as of 6/12/24: 1.59 m (5' 2.6\").    Weight as of 6/12/24: 124.9 kg (275 lb 6.4 oz).       See Patient Instructions: If headaches continue to recur over the next few days we will add a second treatment and schedule eye exam. If symptoms worse, go in for in person exam. See AVS tips    Subjective   Mary is a 19 year old, presenting for the following health issues:  No chief complaint on file.    Hx of migraines in the past but stopped more than a year ago. This headache recurrent over the last week above L eye. No eye pain with palpation of closed eye, no vision change. Mental health stable, stress level ok. OTC helps some times. Discussed treatment, tips on AVS, eye exam. Go in if worsening symptoms.         12/11/2024     6:48 AM   Additional Questions   Roomed by Karli   Accompanied by Self check in     History of Present Illness       Reason for visit:  Headaches  Symptom onset:  1-2 weeks ago  Symptoms include:  Headaches and eye pain  Symptom intensity:  Severe  Symptom progression:  Staying the same  Had these symptoms before:  Yes  Has tried/received treatment for these symptoms:  No  What makes it worse:  No  What makes it better:  No, sometimes sleeping   She is taking medications regularly.         Review of Systems  Constitutional, HEENT, cardiovascular, pulmonary, GI, , musculoskeletal, neuro, skin, endocrine and psych " systems are negative, except as otherwise noted.      Objective           Vitals:  No vitals were obtained today due to virtual visit.    Physical Exam   GENERAL: alert and no distress  EYES: Eyes grossly normal to inspection.  No discharge or erythema, or obvious scleral/conjunctival abnormalities.  RESP: No audible wheeze, cough, or visible cyanosis.    SKIN: Visible skin clear. No significant rash, abnormal pigmentation or lesions.  NEURO: Cranial nerves grossly intact.  Mentation and speech appropriate for age.  PSYCH: Appropriate affect, tone, and pace of words    See orders      Video-Visit Details    Type of service:  Video Visit   Originating Location (pt. Location): Home    Distant Location (provider location):  Off-site  Platform used for Video Visit: John  Signed Electronically by: SAL WALLACE

## 2025-05-13 ENCOUNTER — PATIENT OUTREACH (OUTPATIENT)
Dept: CARE COORDINATION | Facility: CLINIC | Age: 20
End: 2025-05-13
Payer: COMMERCIAL

## 2025-07-07 ENCOUNTER — TELEPHONE (OUTPATIENT)
Dept: FAMILY MEDICINE | Facility: CLINIC | Age: 20
End: 2025-07-07
Payer: COMMERCIAL

## 2025-07-07 NOTE — TELEPHONE ENCOUNTER
Patient Quality Outreach    Patient is due for the following:   Cervical Cancer Screening - PAP Needed  Physical Preventive Adult Physical      Topic Date Due    Meningitis B Vaccine (1 of 2 - Standard) Never done    COVID-19 Vaccine (3 - 2024-25 season) 09/01/2024   Type of outreach:    Sent [employer] message.  Questions for provider review:    None       Janet Velazquez CMA  Chart routed to Care Team.

## 2025-07-12 DIAGNOSIS — G44.011 INTRACTABLE EPISODIC CLUSTER HEADACHE: ICD-10-CM

## 2025-07-14 RX ORDER — RIZATRIPTAN BENZOATE 10 MG/1
TABLET ORAL
Qty: 30 TABLET | Refills: 1 | Status: SHIPPED | OUTPATIENT
Start: 2025-07-14

## 2025-07-19 ENCOUNTER — HEALTH MAINTENANCE LETTER (OUTPATIENT)
Age: 20
End: 2025-07-19